# Patient Record
Sex: FEMALE | Race: WHITE | NOT HISPANIC OR LATINO | Employment: FULL TIME | URBAN - METROPOLITAN AREA
[De-identification: names, ages, dates, MRNs, and addresses within clinical notes are randomized per-mention and may not be internally consistent; named-entity substitution may affect disease eponyms.]

---

## 2023-05-26 ENCOUNTER — ANCILLARY PROCEDURE (OUTPATIENT)
Dept: ULTRASOUND IMAGING | Facility: CLINIC | Age: 33
End: 2023-05-26
Attending: INTERNAL MEDICINE
Payer: COMMERCIAL

## 2023-05-26 DIAGNOSIS — R10.11 RUQ PAIN: ICD-10-CM

## 2023-05-26 PROCEDURE — 76705 ECHO EXAM OF ABDOMEN: CPT

## 2023-09-05 ENCOUNTER — HOSPITAL ENCOUNTER (EMERGENCY)
Facility: CLINIC | Age: 33
Discharge: HOME | End: 2023-09-05
Attending: EMERGENCY MEDICINE | Admitting: EMERGENCY MEDICINE
Payer: COMMERCIAL

## 2023-09-05 VITALS
TEMPERATURE: 98.2 F | SYSTOLIC BLOOD PRESSURE: 110 MMHG | OXYGEN SATURATION: 100 % | HEART RATE: 82 BPM | DIASTOLIC BLOOD PRESSURE: 71 MMHG | RESPIRATION RATE: 16 BRPM

## 2023-09-05 DIAGNOSIS — R11.2 NAUSEA AND VOMITING, UNSPECIFIED VOMITING TYPE: ICD-10-CM

## 2023-09-05 LAB
ALBUMIN SERPL BCG-MCNC: 4.5 G/DL (ref 3.5–5.2)
ALP SERPL-CCNC: 60 U/L (ref 35–104)
ALT SERPL W P-5'-P-CCNC: 14 U/L (ref 0–50)
ANION GAP SERPL CALCULATED.3IONS-SCNC: 14 MMOL/L (ref 7–15)
AST SERPL W P-5'-P-CCNC: 18 U/L (ref 0–45)
BASOPHILS # BLD AUTO: 0 10E3/UL (ref 0–0.2)
BASOPHILS NFR BLD AUTO: 0 %
BILIRUB SERPL-MCNC: 0.6 MG/DL
BUN SERPL-MCNC: 13.6 MG/DL (ref 6–20)
CALCIUM SERPL-MCNC: 8.6 MG/DL (ref 8.6–10)
CHLORIDE SERPL-SCNC: 101 MMOL/L (ref 98–107)
CREAT SERPL-MCNC: 0.73 MG/DL (ref 0.51–0.95)
DEPRECATED HCO3 PLAS-SCNC: 23 MMOL/L (ref 22–29)
EOSINOPHIL # BLD AUTO: 0 10E3/UL (ref 0–0.7)
EOSINOPHIL NFR BLD AUTO: 0 %
ERYTHROCYTE [DISTWIDTH] IN BLOOD BY AUTOMATED COUNT: 12.3 % (ref 10–15)
GFR SERPL CREATININE-BSD FRML MDRD: >90 ML/MIN/1.73M2
GLUCOSE SERPL-MCNC: 123 MG/DL (ref 70–99)
HCT VFR BLD AUTO: 41.4 % (ref 35–47)
HGB BLD-MCNC: 14.2 G/DL (ref 11.7–15.7)
HOLD SPECIMEN: NORMAL
IMM GRANULOCYTES # BLD: 0 10E3/UL
IMM GRANULOCYTES NFR BLD: 0 %
LIPASE SERPL-CCNC: 32 U/L (ref 13–60)
LYMPHOCYTES # BLD AUTO: 0.4 10E3/UL (ref 0.8–5.3)
LYMPHOCYTES NFR BLD AUTO: 6 %
MCH RBC QN AUTO: 30.1 PG (ref 26.5–33)
MCHC RBC AUTO-ENTMCNC: 34.3 G/DL (ref 31.5–36.5)
MCV RBC AUTO: 88 FL (ref 78–100)
MONOCYTES # BLD AUTO: 0.4 10E3/UL (ref 0–1.3)
MONOCYTES NFR BLD AUTO: 5 %
NEUTROPHILS # BLD AUTO: 6.4 10E3/UL (ref 1.6–8.3)
NEUTROPHILS NFR BLD AUTO: 89 %
NRBC # BLD AUTO: 0 10E3/UL
NRBC BLD AUTO-RTO: 0 /100
PLATELET # BLD AUTO: 240 10E3/UL (ref 150–450)
POTASSIUM SERPL-SCNC: 3.8 MMOL/L (ref 3.4–5.3)
PROT SERPL-MCNC: 6.8 G/DL (ref 6.4–8.3)
RBC # BLD AUTO: 4.72 10E6/UL (ref 3.8–5.2)
SODIUM SERPL-SCNC: 138 MMOL/L (ref 136–145)
WBC # BLD AUTO: 7.2 10E3/UL (ref 4–11)

## 2023-09-05 PROCEDURE — 36415 COLL VENOUS BLD VENIPUNCTURE: CPT | Performed by: EMERGENCY MEDICINE

## 2023-09-05 PROCEDURE — 96360 HYDRATION IV INFUSION INIT: CPT

## 2023-09-05 PROCEDURE — 258N000003 HC RX IP 258 OP 636: Performed by: EMERGENCY MEDICINE

## 2023-09-05 PROCEDURE — 80053 COMPREHEN METABOLIC PANEL: CPT | Performed by: EMERGENCY MEDICINE

## 2023-09-05 PROCEDURE — 83690 ASSAY OF LIPASE: CPT | Performed by: EMERGENCY MEDICINE

## 2023-09-05 PROCEDURE — 85025 COMPLETE CBC W/AUTO DIFF WBC: CPT | Performed by: EMERGENCY MEDICINE

## 2023-09-05 PROCEDURE — 99283 EMERGENCY DEPT VISIT LOW MDM: CPT | Mod: 25

## 2023-09-05 RX ORDER — ONDANSETRON 4 MG/1
4 TABLET, ORALLY DISINTEGRATING ORAL EVERY 8 HOURS PRN
Qty: 10 TABLET | Refills: 0 | Status: SHIPPED | OUTPATIENT
Start: 2023-09-05 | End: 2023-09-08

## 2023-09-05 RX ADMIN — SODIUM CHLORIDE 1000 ML: 9 INJECTION, SOLUTION INTRAVENOUS at 09:17

## 2023-09-05 RX ADMIN — SODIUM CHLORIDE 1000 ML: 9 INJECTION, SOLUTION INTRAVENOUS at 09:48

## 2023-09-05 ASSESSMENT — ACTIVITIES OF DAILY LIVING (ADL): ADLS_ACUITY_SCORE: 35

## 2023-09-05 NOTE — ED PROVIDER NOTES
History     Chief Complaint:  Nausea and Vomiting        The history is provided by the patient.      Merissa Enriquez is a 32 year old female who presents with nausea and vomiting since 1930 last night. Additionally, she has been experiencing some abdominal pain whenever she vomits and chills. She took a dose of Zofran 90 minutes ago for symptom management. She is concerned for food poisoning as she had her wedding 2 nights ago. Her sister also had the same symptoms and was seen in this ED yesterday for them. The sister's symptoms have resolved since then. She denies diarrhea or fevers.     Independent Historian:   The patient's mother is present and provides additional history in regards to her sister recently having similar symptoms that resolved after a few hours.    Review of External Notes:   Outpatient notes reviewed from October 19, 2022 and the patient was seen for obesity with insulin resistance.    Medications:    Ozempic     Past Medical History:    The patient denies pertinent past medical history.    Past Surgical History:    The patient denies pertinent past surgical history.     Physical Exam   Patient Vitals for the past 24 hrs:   BP Temp Temp src Pulse Resp SpO2   09/05/23 1015 -- -- -- -- -- 100 %   09/05/23 0951 105/68 -- -- 82 -- 99 %   09/05/23 0918 127/74 98.2  F (36.8  C) Oral 85 16 100 %        Physical Exam  Constitutional:       General: She is not in acute distress.     Appearance: Normal appearance. She is not diaphoretic.   HENT:      Head: Atraumatic.      Right Ear: External ear normal.      Left Ear: External ear normal.      Mouth/Throat:      Mouth: Mucous membranes are moist.   Eyes:      General: No scleral icterus.     Conjunctiva/sclera: Conjunctivae normal.   Cardiovascular:      Rate and Rhythm: Normal rate and regular rhythm.      Heart sounds: Normal heart sounds.   Pulmonary:      Effort: No respiratory distress.      Breath sounds: Normal breath sounds.   Abdominal:       General: Abdomen is flat. There is no distension.      Tenderness: There is no abdominal tenderness. There is no guarding.      Comments: No right upper quadrant tenderness.   Musculoskeletal:      Cervical back: Neck supple.      Right lower leg: No edema.      Left lower leg: No edema.   Skin:     General: Skin is warm.      Capillary Refill: Capillary refill takes less than 2 seconds.      Findings: No rash.   Neurological:      General: No focal deficit present.      Mental Status: She is alert and oriented to person, place, and time.   Psychiatric:         Mood and Affect: Mood normal.         Behavior: Behavior normal.           Emergency Department Course     Laboratory:  Labs Ordered and Resulted from Time of ED Arrival to Time of ED Departure   COMPREHENSIVE METABOLIC PANEL - Abnormal       Result Value    Sodium 138      Potassium 3.8      Chloride 101      Carbon Dioxide (CO2) 23      Anion Gap 14      Urea Nitrogen 13.6      Creatinine 0.73      Calcium 8.6      Glucose 123 (*)     Alkaline Phosphatase 60      AST 18      ALT 14      Protein Total 6.8      Albumin 4.5      Bilirubin Total 0.6      GFR Estimate >90     CBC WITH PLATELETS AND DIFFERENTIAL - Abnormal    WBC Count 7.2      RBC Count 4.72      Hemoglobin 14.2      Hematocrit 41.4      MCV 88      MCH 30.1      MCHC 34.3      RDW 12.3      Platelet Count 240      % Neutrophils 89      % Lymphocytes 6      % Monocytes 5      % Eosinophils 0      % Basophils 0      % Immature Granulocytes 0      NRBCs per 100 WBC 0      Absolute Neutrophils 6.4      Absolute Lymphocytes 0.4 (*)     Absolute Monocytes 0.4      Absolute Eosinophils 0.0      Absolute Basophils 0.0      Absolute Immature Granulocytes 0.0      Absolute NRBCs 0.0     LIPASE - Normal    Lipase 32          Emergency Department Course & Assessments:    Interventions:  Medications   0.9% sodium chloride BOLUS (0 mLs Intravenous Stopped 9/5/23 0945)   0.9% sodium chloride BOLUS (1,000 mLs  Intravenous $New Bag 9/5/23 0948)      Disposition:  The patient was discharged to home.     Impression & Plan      Medical Decision Making:  This patient is a 32-year-old who presents with multiple episodes of vomiting.  Her sister recently had similar symptoms and improved with IV fluids.    The patient has no abdominal tenderness.  Her symptoms have resolved with IV fluids and Zofran taken prior to arrival.  This may be a viral syndrome given the recent ill exposure.  She question food poisoning as she did recently have her wedding and there was some food sitting out.    We discussed the possibility of biliary colic.  This is unlikely as she has no abdominal pain and specifically no right upper quadrant pain or tenderness on initial exam or repeat exam.  White count, LFTs, and lipase are normal.  She did have a recent ultrasound with gallstones but is been treated through her outpatient clinic and has not had any recent episodes of biliary colic.  So this seems unlikely and she agrees that we do not need to do the ultrasound today.  She will return if worse.    The patient will have a prescription for Zofran sent to her pharmacy.  We discussed return precautions.    Diagnosis:    ICD-10-CM    1. Nausea and vomiting, unspecified vomiting type  R11.2            Discharge Medications:  New Prescriptions    ONDANSETRON (ZOFRAN ODT) 4 MG ODT TAB    Take 1 tablet (4 mg) by mouth every 8 hours as needed for nausea          Scribe Disclosure:  I, Blayne Gannon, am serving as a scribe at 8:59 AM on 9/5/2023 to document services personally performed by Umair Piedra MD based on my observations and the provider's statements to me.   9/5/2023   Umair Piedra MD McRoberts, Sean Edward, MD  09/05/23 1056

## 2023-09-05 NOTE — ED TRIAGE NOTES
Pt reports onset of N/V at 7:30pm yesterday; denies any abd. pain or discomfort; denies diarrhea. Took one tablet of zofran at home with little to no relief.     Triage Assessment       Row Name 09/05/23 0904       Respiratory WDL    Rhythm/Pattern, Respiratory no shortness of breath reported;depth regular;pattern regular;unlabored    Expansion/Accessory Muscles/Retractions expansion symmetric;no retractions;no use of accessory muscles       Cardiac WDL    Cardiac WDL --  well perfused, denies CP       Cognitive/Neuro/Behavioral WDL    Level of Consciousness alert    Arousal Level opens eyes spontaneously    Orientation oriented x 4    Speech clear;logical    Mood/Behavior calm;cooperative       Jean Coma Scale    Best Eye Response 4-->(E4) spontaneous    Best Motor Response 6-->(M6) obeys commands    Best Verbal Response 5-->(V5) oriented    Jean Coma Scale Score 15

## 2023-09-05 NOTE — DISCHARGE INSTRUCTIONS
Plenty fluids to stay hydrated.    Return to the ER for fever, worsening pain, persistent vomiting, or any new concerns.

## 2023-12-31 ENCOUNTER — HEALTH MAINTENANCE LETTER (OUTPATIENT)
Age: 33
End: 2023-12-31

## 2024-02-18 ENCOUNTER — APPOINTMENT (OUTPATIENT)
Dept: ULTRASOUND IMAGING | Facility: CLINIC | Age: 34
End: 2024-02-18
Attending: SOCIAL WORKER
Payer: COMMERCIAL

## 2024-02-18 ENCOUNTER — TELEPHONE (OUTPATIENT)
Dept: EMERGENCY MEDICINE | Facility: CLINIC | Age: 34
End: 2024-02-18

## 2024-02-18 ENCOUNTER — HOSPITAL ENCOUNTER (EMERGENCY)
Facility: CLINIC | Age: 34
Discharge: HOME | End: 2024-02-18
Attending: SOCIAL WORKER | Admitting: SOCIAL WORKER
Payer: COMMERCIAL

## 2024-02-18 VITALS
WEIGHT: 178 LBS | DIASTOLIC BLOOD PRESSURE: 55 MMHG | TEMPERATURE: 98.4 F | HEART RATE: 84 BPM | SYSTOLIC BLOOD PRESSURE: 123 MMHG | OXYGEN SATURATION: 100 % | RESPIRATION RATE: 16 BRPM

## 2024-02-18 DIAGNOSIS — O20.9 FIRST TRIMESTER BLEEDING: ICD-10-CM

## 2024-02-18 LAB
ABO/RH(D): NORMAL
ALBUMIN UR-MCNC: NEGATIVE MG/DL
ANTIBODY SCREEN: NEGATIVE
APPEARANCE UR: CLEAR
BILIRUB UR QL STRIP: NEGATIVE
COLOR UR AUTO: NORMAL
GLUCOSE UR STRIP-MCNC: NEGATIVE MG/DL
HCG INTACT+B SERPL-ACNC: ABNORMAL MIU/ML
HCG SERPL QL: POSITIVE
HGB UR QL STRIP: NEGATIVE
KETONES UR STRIP-MCNC: NEGATIVE MG/DL
LEUKOCYTE ESTERASE UR QL STRIP: NEGATIVE
NITRATE UR QL: NEGATIVE
PH UR STRIP: 7 [PH] (ref 5–7)
RBC URINE: <1 /HPF
SP GR UR STRIP: 1 (ref 1–1.03)
SPECIMEN EXPIRATION DATE: NORMAL
UROBILINOGEN UR STRIP-MCNC: NORMAL MG/DL
WBC URINE: 1 /HPF

## 2024-02-18 PROCEDURE — 36415 COLL VENOUS BLD VENIPUNCTURE: CPT | Performed by: SOCIAL WORKER

## 2024-02-18 PROCEDURE — 99285 EMERGENCY DEPT VISIT HI MDM: CPT | Mod: 25

## 2024-02-18 PROCEDURE — 81001 URINALYSIS AUTO W/SCOPE: CPT | Performed by: SOCIAL WORKER

## 2024-02-18 PROCEDURE — 84703 CHORIONIC GONADOTROPIN ASSAY: CPT | Performed by: SOCIAL WORKER

## 2024-02-18 PROCEDURE — 76801 OB US < 14 WKS SINGLE FETUS: CPT

## 2024-02-18 PROCEDURE — 86900 BLOOD TYPING SEROLOGIC ABO: CPT | Performed by: SOCIAL WORKER

## 2024-02-18 PROCEDURE — 84702 CHORIONIC GONADOTROPIN TEST: CPT | Performed by: SOCIAL WORKER

## 2024-02-18 ASSESSMENT — ACTIVITIES OF DAILY LIVING (ADL)
ADLS_ACUITY_SCORE: 35
ADLS_ACUITY_SCORE: 35

## 2024-02-18 NOTE — ED TRIAGE NOTES
Patient is 10 weeks pregnant and noticed some vaginal bleeding this morning.  She denies pain at this time.     Triage Assessment (Adult)       Row Name 02/18/24 0656          Triage Assessment    Airway WDL WDL        Respiratory WDL    Respiratory WDL WDL        Skin Circulation/Temperature WDL    Skin Circulation/Temperature WDL WDL        Cardiac WDL    Cardiac WDL WDL        Peripheral/Neurovascular WDL    Peripheral Neurovascular WDL WDL        Cognitive/Neuro/Behavioral WDL    Cognitive/Neuro/Behavioral WDL WDL

## 2024-02-18 NOTE — TELEPHONE ENCOUNTER
[facility]    Reason for call: Lab Result Notification     Lab Result (including Rx patient on, if applicable).  If culture, copy of lab report at bottom.  Lab Result:   Component      Latest Ref Rng 2/18/2024  7:25 AM   hCG Quantitative      <5 mIU/mL 99,756 (H)       Legend:  (H) High    Creatinine Level (mg/dl)   Creatinine   Date Value Ref Range Status   09/05/2023 0.73 0.51 - 0.95 mg/dL Final    Creatinine clearance (ml/min), if applicable    Creatinine clearance cannot be calculated (Unknown ideal weight.)     Patient's current Symptoms: call to patient she has OBGYN appointment tomorrow  Vaginal bleeding: None  Fever:  None    RN Recommendations/Instructions per [place] ED lab result protocol:   [facility] ED lab result protocol utilized: [facility]  Patient notified of lab results & recommendations to please follow up with OBGYN for interpretation & recommendation in 1-2 days, return for any worsening fevers, severe continuous abdominal pain, vaginal bleeding more than 1 pad an hour for more than 1 hour patient verbalized understanding and agrees with plan.      Patient/care giver notified to contact your PCP clinic or return to the Emergency department if your:  Symptoms return.  Symptoms worsen or other concerning symptoms.    Ger Sanders RN

## 2024-02-18 NOTE — ED PROVIDER NOTES
History     Chief Complaint:  Vaginal Bleeding       HPI   Merissa Enriquez is a 33 year old female currently approximately 9 weeks pregnant no other medical problems who presented to the emergency room with a chief complaint of vaginal bleeding.  Patient was in her usual state of health yesterday.  This morning around 0600 she went to the bathroom and when she wiped there is a small amount of blood and clot.  She has not had any bleeding since then.  She denies any abdominal pain, cramping, bleeding from anywhere else.  No nausea vomiting, no fever.  She called her OB/GYN clinic and was told to come in.    Independent Historian:    None - Patient Only    Review of External Notes:  None    Allergies:  No Known Allergies     Relevant Medical History:    Physical Exam   Patient Vitals for the past 24 hrs:   BP Temp Temp src Pulse Resp SpO2 Weight   02/18/24 0654 123/55 98.4  F (36.9  C) Oral 84 16 100 % 80.7 kg (178 lb)        Physical Exam  General: Overall stable and nontoxic appearing  HEENT: Conjunctivae clear, no scleral icterus, mucous membranes moist  Neuro: Alert, moving all extremities equally with intention  CV: Regular rate and rhythm, radial and DP pulses equal  Respiratory: No signs of respiratory distress, lungs clear to auscultation bilaterally   Abdomen: Soft, without rigidity or rebound throughout   No focal RLQ tenderness   No focal RUQ tenderness   No CVA tenderness bilaterally  MSK: No lower extremity swelling or tenderness     Emergency Department Course       Laboratory: Imaging:   Labs Ordered and Resulted from Time of ED Arrival to Time of ED Departure   HCG QUALITATIVE PREGNANCY - Abnormal       Result Value    hCG Serum Qualitative Positive (*)    HCG QUANTITATIVE PREGNANCY   ROUTINE UA WITH MICROSCOPIC REFLEX TO CULTURE   TYPE AND SCREEN, ADULT    ABO/RH(D) B POS      Antibody Screen Negative      SPECIMEN EXPIRATION DATE [id number]     ABO/RH TYPE AND SCREEN     US OB < 14 Weeks w  Transvaginal   Final Result   IMPRESSION:    1.  Single living intrauterine gestation at 9 weeks and 5 days, EDC 2024.                    Procedures       Emergency Department Course & Assessments:             Interventions:  Medications - No data to display     Assessments, Independent Interpretation, Consult/Discussion of ManagementTests:  ED Course as of 24 0904   Sun 2024   0902 ABO/Rh(D): B POS       Social Determinants of Health affecting care:  None    Disposition:  The patient was discharged to home.     Impression & Plan    CMS Diagnoses: None    Code Status: No Order    Medical Decision Makin-year-old female approximately 10 weeks pregnant otherwise without significant past medical history who presented to the emergency department with a chief complaint of vaginal bleeding.  Stable overall on exam nontoxic.  No signs of hemorrhage.  She only noticed the bleeding one time earlier today when she wiped, has not been saturating any pads.  No abdominal cramping or pain.  Beta-hCG above the threshold and ultrasound showed a viable intrauterine gestation.  Patient Rh+.   No indication for RhoGAM. Otherwise in her usual state of health therefore do not feel that additional laboratory workup was necessary. Pregnancy was not conceived by individual fertilization therefore I very strongly doubt heterotopic pregnancy.  No signs of ectopic pregnancy.  Patient denied any bleeding from anywhere else doubt underlying bleeding or clotting disorder. UA was sent for screening, no asymptomatic bacteruria nor UTI.  I discussed that there were many different etiologies for first trimester bleeding, overall reassuring workup here and counseled close follow-up with her OB/GYN, she will give them a call to oral.  Return precautions were discussed with her and she verbalized understanding.  She was discharged in stable condition.    Diagnosis:    ICD-10-CM    1. First trimester bleeding  O20.9             Discharge Medications:  New Prescriptions    No medications on file          2/18/2024   Mary Yoo MD Wu Klasek, Connie, MD  02/18/24 2105       Mary Yoo MD  02/18/24 2105

## 2024-02-18 NOTE — DISCHARGE INSTRUCTIONS
You were seen in the emergency department for blood when you are wiping, the ultrasound here was reassuring and showed a pregnancy located in the uterus with a heartbeat.  With the minimal amount of bleeding that you have been having, this is an overall reassuring presentation.    Please call your OB/GYN clinic to schedule a follow-up appointment in the next few days.    Your urine showed**    If you start to have significant bleeding, such as saturating 1 pad per hour for more than 2 hours, if you have severe abdominal pain and cramping, if you develop chest pain or shortness of breath, if you have fainting, or other concerning symptoms please come back to the emergency department.

## 2024-02-18 NOTE — TELEPHONE ENCOUNTER
[facility]    Reason for call: Lab Result Notification     Lab Result (including Rx patient on, if applicable).  If culture, copy of lab report at bottom.  Lab Result:   Component      Latest Ref Rng 2/18/2024  7:25 AM   hCG Quantitative      <5 mIU/mL 99,756 (H)       Legend:  (H) High    Creatinine Level (mg/dl)   Creatinine   Date Value Ref Range Status   09/05/2023 0.73 0.51 - 0.95 mg/dL Final    Creatinine clearance (ml/min), if applicable    Creatinine clearance cannot be calculated (Unknown ideal weight.)     Left voicemail message requesting a call back to [facility] ED Lab Result RN at [phone]. RN is available every day between 9 a.m. and 5:30 p.m.     If patient calls back, notify of lab result. Advise to relay to PCP.      RN Recommendations/Instructions per [place] ED lab result protocol:   [facility] ED lab result protocol utilized: [facility]      Ashanti Donovann, RN

## 2024-12-15 ENCOUNTER — HEALTH MAINTENANCE LETTER (OUTPATIENT)
Age: 34
End: 2024-12-15